# Patient Record
Sex: FEMALE | Race: WHITE | NOT HISPANIC OR LATINO | Employment: UNEMPLOYED | URBAN - METROPOLITAN AREA
[De-identification: names, ages, dates, MRNs, and addresses within clinical notes are randomized per-mention and may not be internally consistent; named-entity substitution may affect disease eponyms.]

---

## 2019-12-16 PROBLEM — E53.8 B12 DEFICIENCY: Status: ACTIVE | Noted: 2018-08-20

## 2019-12-16 PROBLEM — K80.20 SYMPTOMATIC CHOLELITHIASIS: Status: ACTIVE | Noted: 2019-08-14

## 2020-01-16 PROBLEM — M16.11 PRIMARY OSTEOARTHRITIS OF RIGHT HIP: Status: ACTIVE | Noted: 2020-01-16

## 2020-05-18 PROBLEM — Z90.49 S/P LAPAROSCOPIC CHOLECYSTECTOMY: Status: RESOLVED | Noted: 2020-05-18 | Resolved: 2020-05-18

## 2020-05-18 PROBLEM — Z90.49 S/P LAPAROSCOPIC CHOLECYSTECTOMY: Status: ACTIVE | Noted: 2020-05-18

## 2020-05-18 PROBLEM — K80.20 SYMPTOMATIC CHOLELITHIASIS: Status: RESOLVED | Noted: 2019-08-14 | Resolved: 2020-05-18

## 2020-08-20 PROBLEM — R07.9 CHEST PAIN: Status: ACTIVE | Noted: 2020-08-20

## 2020-11-19 PROBLEM — R07.9 CHEST PAIN: Status: RESOLVED | Noted: 2020-08-20 | Resolved: 2020-11-19

## 2021-03-23 PROBLEM — K58.0 IRRITABLE BOWEL SYNDROME WITH DIARRHEA: Status: ACTIVE | Noted: 2021-03-23

## 2021-05-12 ENCOUNTER — PATIENT MESSAGE (OUTPATIENT)
Dept: RESEARCH | Facility: HOSPITAL | Age: 46
End: 2021-05-12

## 2021-08-31 PROBLEM — K22.2 ESOPHAGEAL STRICTURE: Status: ACTIVE | Noted: 2021-08-31

## 2022-05-26 PROBLEM — Z98.890 S/P COLONOSCOPY: Status: ACTIVE | Noted: 2022-05-26

## 2022-05-26 LAB — CRC RECOMMENDATION EXT: NORMAL

## 2022-05-31 ENCOUNTER — PATIENT OUTREACH (OUTPATIENT)
Dept: ADMINISTRATIVE | Facility: HOSPITAL | Age: 47
End: 2022-05-31

## 2023-03-13 PROBLEM — D89.9 IMMUNE DISORDER: Status: ACTIVE | Noted: 2023-03-13

## 2023-03-13 PROBLEM — L50.1 CHRONIC IDIOPATHIC URTICARIA: Status: ACTIVE | Noted: 2023-03-13

## 2023-03-13 PROBLEM — T78.1XXA ADVERSE REACTION TO FOOD, INITIAL ENCOUNTER: Status: ACTIVE | Noted: 2023-03-13

## 2023-03-13 PROBLEM — T50.905A DRUG REACTION: Status: ACTIVE | Noted: 2023-03-13

## 2023-03-22 PROBLEM — T50.905A DRUG REACTION: Status: RESOLVED | Noted: 2023-03-13 | Resolved: 2023-03-22

## 2023-03-22 PROBLEM — T78.1XXA ADVERSE REACTION TO FOOD, INITIAL ENCOUNTER: Status: RESOLVED | Noted: 2023-03-13 | Resolved: 2023-03-22

## 2023-05-09 PROBLEM — J31.0 NONALLERGIC RHINITIS: Status: ACTIVE | Noted: 2023-05-09

## 2023-12-30 PROBLEM — R13.10 DYSPHAGIA: Status: ACTIVE | Noted: 2023-12-30

## 2024-02-20 ENCOUNTER — PATIENT OUTREACH (OUTPATIENT)
Dept: ADMINISTRATIVE | Facility: OTHER | Age: 49
End: 2024-02-20

## 2024-02-20 NOTE — PROGRESS NOTES
CHW - Initial Contact    This Community Health Worker completed the Social Determinant of Health questionnaire with patient during clinic visit today.    Pt identified barriers of most importance are: patient identified no barriers of importance during clinic visit    Referrals to community agencies completed with patient consent outside of [facility] include: No community referral needed during clinic visit   Referrals were put through [facility] - no:   Support and Services: No support services needed during clinic visit   Other information discussed the patient needs  help with: patient discussed no other information during clinic visit    Follow up required: yes  Follow-up Outreach - Due: 3/5/2024

## 2024-03-08 ENCOUNTER — PATIENT OUTREACH (OUTPATIENT)
Dept: ADMINISTRATIVE | Facility: OTHER | Age: 49
End: 2024-03-08

## 2024-03-08 NOTE — PROGRESS NOTES
CHW - Follow Up    This Community Health Worker completed a follow up visit with patient via telephone today.  Pt reported: patient reported she is doing fine no assistance is needed during follow up phone interview.  Patient will reach out if assistance is needed in the future.   Community Health Worker provided: CHW spoke with patient she is doing fine.   Follow up required: No   No future outreach task assigned                   CHW - Case Closure    This Community Health Worker spoke to patient via telephone today.   Pt reported: patient reported she is doing fine no assistance is needed during follow up phone interview.  Patient will reach out if assistance is needed in the future.   Pt denied any additional needs at this time and agrees with episode closure at this time.    Provided patient with Community Health Worker's contact information and encouraged   her to contact this Community Health Worker if additional needs arise.